# Patient Record
Sex: FEMALE | Race: BLACK OR AFRICAN AMERICAN | NOT HISPANIC OR LATINO | Employment: OTHER | URBAN - METROPOLITAN AREA
[De-identification: names, ages, dates, MRNs, and addresses within clinical notes are randomized per-mention and may not be internally consistent; named-entity substitution may affect disease eponyms.]

---

## 2019-08-27 PROBLEM — K64.9 HEMORRHOIDS: Status: ACTIVE | Noted: 2019-08-27

## 2019-08-27 PROBLEM — Z00.00 HEALTH CARE MAINTENANCE: Status: ACTIVE | Noted: 2019-08-27

## 2019-08-27 PROBLEM — I10 HYPERTENSION: Status: ACTIVE | Noted: 2019-08-27

## 2019-08-27 PROBLEM — J30.2 SEASONAL ALLERGIES: Status: ACTIVE | Noted: 2019-08-27

## 2019-08-27 PROBLEM — B00.9 HERPES: Status: ACTIVE | Noted: 2019-08-27

## 2019-11-27 PROBLEM — Z01.20 ENCOUNTER FOR DENTAL EXAMINATION: Status: ACTIVE | Noted: 2019-08-27

## 2019-11-27 PROBLEM — Z12.31 OTHER SCREENING MAMMOGRAM: Status: ACTIVE | Noted: 2019-11-27

## 2019-11-27 PROBLEM — B96.89 BACTERIAL VAGINOSIS: Status: ACTIVE | Noted: 2019-11-27

## 2019-11-27 PROBLEM — J06.9 UPPER RESPIRATORY INFECTION: Status: ACTIVE | Noted: 2019-11-27

## 2019-11-27 PROBLEM — B37.31 CANDIDIASIS OF GENITALIA IN FEMALE: Status: ACTIVE | Noted: 2019-11-27

## 2019-11-27 PROBLEM — M79.609 PAIN IN SOFT TISSUES OF LIMB: Status: ACTIVE | Noted: 2019-11-27

## 2019-11-27 PROBLEM — L30.9 DERMATITIS: Status: ACTIVE | Noted: 2019-11-27

## 2019-11-27 PROBLEM — K02.52 DENTAL CARIES ON PIT AND FISSURE SURFACE PENETRATING INTO DENTIN: Status: ACTIVE | Noted: 2017-02-15

## 2019-11-27 PROBLEM — F50.89 PICA: Status: ACTIVE | Noted: 2019-11-27

## 2019-11-27 PROBLEM — Z87.39 HISTORY OF RHEUMATOID ARTHRITIS: Status: ACTIVE | Noted: 2019-11-27

## 2019-11-27 PROBLEM — R11.10 VOMITING ALONE: Status: ACTIVE | Noted: 2019-11-27

## 2019-11-27 PROBLEM — K02.62 DENTAL CARIES ON SMOOTH SURFACE PENETRATING INTO DENTIN: Status: ACTIVE | Noted: 2017-05-26

## 2019-11-27 PROBLEM — M54.9 BACKACHE: Status: ACTIVE | Noted: 2019-11-27

## 2019-11-27 PROBLEM — K05.00 ACUTE GINGIVITIS, PLAQUE INDUCED: Status: ACTIVE | Noted: 2017-02-15

## 2019-11-27 PROBLEM — M06.09 RHEUMATOID ARTHRITIS OF MULTIPLE SITES WITH NEGATIVE RHEUMATOID FACTOR: Status: ACTIVE | Noted: 2019-05-07

## 2019-11-27 PROBLEM — K04.7 PERIAPICAL ABSCESS WITHOUT SINUS: Status: ACTIVE | Noted: 2019-11-27

## 2019-11-27 PROBLEM — N76.0 BACTERIAL VAGINOSIS: Status: ACTIVE | Noted: 2019-11-27

## 2019-11-27 PROBLEM — K21.9 GERD WITHOUT ESOPHAGITIS: Status: ACTIVE | Noted: 2019-11-27

## 2019-11-27 PROBLEM — Z20.2 EXPOSURE TO SEXUALLY TRANSMITTED DISEASE (STD): Status: ACTIVE | Noted: 2019-11-27

## 2019-11-27 PROBLEM — N76.0 ACUTE VAGINITIS: Status: ACTIVE | Noted: 2019-11-27

## 2019-11-27 PROBLEM — N89.8 PROBLEMATIC VAGINAL DISCHARGE: Status: ACTIVE | Noted: 2019-11-27

## 2019-12-09 ENCOUNTER — TELEPHONE (OUTPATIENT)
Dept: PHARMACY | Facility: CLINIC | Age: 49
End: 2019-12-09

## 2019-12-09 NOTE — TELEPHONE ENCOUNTER
No answer/VM to inform patient that [facility] received prescription for Humira CF and prior authorization is required.  [facility] will be back in touch once insurance determination is received.     Pupils equal, round and reactive to light, Extra-ocular movement intact, eyes are clear b/l

## 2019-12-11 NOTE — TELEPHONE ENCOUNTER
DOCUMENTATION ONLY:     Prior Authorization for Humira APPROVED from 12/11/19 to 12/11/20.      Case ID# [id number]    Co-Pay: $0.00    Patient Assistance IS NOT required.     Forward to clinical pharmacist for consult & shipment.      MARIE

## 2019-12-16 NOTE — TELEPHONE ENCOUNTER
Humira consult declined, experienced to medication. Pt confirmed shipping of Humira CF on  to arrive on . Address and  verified. $0 copay in 004. Pt is in need of a new sharps container. Pt has 0 doses on hand, next dose due . All questions answered and addressed to patients satisfaction. [facility] to contact patient in 3 weeks for refills.

## 2020-02-10 ENCOUNTER — TELEPHONE (OUTPATIENT)
Dept: PHARMACY | Facility: CLINIC | Age: 50
End: 2020-02-10

## 2020-03-13 ENCOUNTER — TELEPHONE (OUTPATIENT)
Dept: PHARMACY | Facility: CLINIC | Age: 50
End: 2020-03-13

## 2020-05-12 ENCOUNTER — TELEPHONE (OUTPATIENT)
Dept: PHARMACY | Facility: CLINIC | Age: 50
End: 2020-05-12

## 2020-05-28 ENCOUNTER — TELEPHONE (OUTPATIENT)
Dept: PHARMACY | Facility: CLINIC | Age: 50
End: 2020-05-28

## 2020-05-28 NOTE — TELEPHONE ENCOUNTER
Patient confirmed shipping of Humira on  to arrive . Address and  verified. $0 copay (004). Patient stated she has 0 doses left. She reported 0 missed doses. Next dose due . She stated she has not started any new medications or had any dose changes. She reported no new allergies or health conditions. Patient had no further questions or concerns.

## 2020-06-26 ENCOUNTER — TELEPHONE (OUTPATIENT)
Dept: PHARMACY | Facility: CLINIC | Age: 50
End: 2020-06-26

## 2020-07-07 ENCOUNTER — TELEPHONE (OUTPATIENT)
Dept: PHARMACY | Facility: CLINIC | Age: 50
End: 2020-07-07

## 2020-07-07 NOTE — TELEPHONE ENCOUNTER
Informed Patient  that [facility] received prescription for Actemra  and prior authorization is required.  [facility] will be back in touch once insurance determination is received.

## 2020-07-08 NOTE — TELEPHONE ENCOUNTER
DOCUMENTATION ONLY:     Prior Authorization for Actemra APPROVED from 7/8/20 to 1/7/21.      Case ID# [id number]    Co-Pay: $0.00     Patient Assistance IS NOT required.     Forward to clinical pharmacist for consult & shipment.     MARIE

## 2020-07-09 ENCOUNTER — TELEPHONE (OUTPATIENT)
Dept: PHARMACY | Facility: CLINIC | Age: 50
End: 2020-07-09

## 2020-07-09 NOTE — TELEPHONE ENCOUNTER
Initial Actemra Syringe consult completed on . Actemra will be shipped on  to arrive at patient's home on  via [employer]. $ 0 copay.  Patient plans to start Actemra on . Address confirmed. Confirmed 2 patient identifiers - name and . Therapy Appropriate.     Counseled patient on administration directions:  Inject Actemra 162mg/0.9ml Syringe (1 syringe ) into the skin every 14 days.  - Take out the refrigerator 30-60 minutes prior to injection.   - Wash hands before and after injection.  - Monthly RX will come with gauze, bandaids, and alcohol swabs.  - Patient may inject in either the tops of the thighs, abdomen- but at least 2 inches away from her belly button, or the outer part of her upper arm. Patient was instructed to rotate injections sites.  - Patient is to wipe down the injection site with the alcohol pad, wait to dry. Gently squeeze the area of the cleaned skin and hold it firmly. Insert the needle in to the skin between a 45 and 90 degree angle, release the skin and push down on the plunger to administer the dose.   - Place the entire syringe into the sharps container. Once the container is full, per [place] law, she may lock the sharps container and place in her trash. She can then contact the Pharmacy and we will replace the sharps at no additional charge.     Patient was counseled on possible side effects:  - Injection site reaction: redness, soreness, itching, bruising, which should resolve within 3-5 days.  - Increased risk for infections. If patient becomes sick, patient is to hold Actemra use until she/ he is better.      DDI: none, medication list reviewed.     Patient verbalized understanding. Compliance stressed. Patient advised to keep a calendar marking dates of injections to ensure better compliance. Patient advised to call myself or provider should any questions arise. Patient plans to start Actemra on . Consultation included: indication; goals of treatment; administration; storage  and handling; side effects; how to handle side effects; the importance of compliance; how to handle missed doses; the importance of laboratory monitoring; the importance of keeping all follow up appointments. Patient understands to report any medication changes to [facility] and provider. All questions answered and addressed to patients satisfaction. [facility] to contact patient in 3 weeks for refills and will follow up in 90 days.

## 2020-08-08 ENCOUNTER — TELEPHONE (OUTPATIENT)
Dept: PHARMACY | Facility: CLINIC | Age: 50
End: 2020-08-08

## 2020-08-08 NOTE — TELEPHONE ENCOUNTER
Refill call regarding Actemra from [facility]. Shipping out Actemra on  for  arrival with patients consent. Copay of $0 @ 004. Address and  confirmed.

## 2020-09-08 ENCOUNTER — TELEPHONE (OUTPATIENT)
Dept: PHARMACY | Facility: CLINIC | Age: 50
End: 2020-09-08

## 2020-10-07 ENCOUNTER — TELEPHONE (OUTPATIENT)
Dept: PHARMACY | Facility: CLINIC | Age: 50
End: 2020-10-07

## 2020-10-07 NOTE — TELEPHONE ENCOUNTER
LVM for pt regarding actemra refill 10/7 (attempt #1) spoke to mother on alternative line and she states she will have pt call [facility] back to set up refill.

## 2020-10-15 PROBLEM — Z79.899 HIGH RISK MEDICATION USE: Status: ACTIVE | Noted: 2020-10-15

## 2020-10-20 PROBLEM — M54.9 DORSALGIA, UNSPECIFIED: Status: ACTIVE | Noted: 2020-10-20

## 2020-10-20 PROBLEM — R44.1 HALLUCINATION, VISUAL: Status: ACTIVE | Noted: 2020-10-20

## 2020-10-21 PROBLEM — Z80.0 FAMILY HISTORY OF COLON CANCER IN FATHER: Status: ACTIVE | Noted: 2020-10-21

## 2020-10-21 PROBLEM — K92.1 HEMATOCHEZIA: Status: ACTIVE | Noted: 2020-10-21

## 2020-10-21 PROBLEM — E66.9 CLASS 2 OBESITY WITH BODY MASS INDEX (BMI) OF 35.0 TO 35.9 IN ADULT: Status: ACTIVE | Noted: 2020-10-21

## 2020-11-17 ENCOUNTER — TELEPHONE (OUTPATIENT)
Dept: PHARMACY | Facility: CLINIC | Age: 50
End: 2020-11-17

## 2020-12-16 ENCOUNTER — SPECIALTY PHARMACY (OUTPATIENT)
Dept: PHARMACY | Facility: CLINIC | Age: 50
End: 2020-12-16

## 2020-12-16 NOTE — TELEPHONE ENCOUNTER
Specialty Pharmacy - Refill Coordination    Specialty Medication Orders Linked to Encounter      Most Recent Value   Medication #1  tocilizumab (ACTEMRA) 162 mg/0.9 mL injection (Order#[id number], Rx#[id number])          Refill Questions - Documented Responses      Most Recent Value   Relationship to patient of person spoken to?  Self   HIPAA/medical authority confirmed?  Yes   Any changes in contact preferences or allowed representatives?  No   Has the patient had any insurance changes?  No   Has the patient had any changes to specialty medication, dose, or instructions?  No   Has the patient started taking any new medications, herbals, or supplements?  No   Has the patient been diagnosed with any new medical conditions?  No   Does the patient have any new allergies to medications or foods?  No   Does the patient have any concerns about side effects?  No   Can the patient store medication/sharps container properly (at the correct temperature, away from children/pets, etc.)?  Yes   Can the patient call emergency services (911) in the event of an emergency?  Yes   Does the patient have any concerns or questions about taking or administering this medication as prescribed?  No   How many doses did the patient miss in the past 4 weeks or since the last fill?  0   How many doses does the patient have on hand?  0   How many days does the patient report on hand quantity will last?  0   Does the number of doses/days supply remaining match pharmacy expected amounts?  Yes   Does the patient feel that this medication is effective?  Yes   During the past 4 weeks, has patient missed any activities due to condition or medication?  No   During the past 4 weeks, did patient have any of the following urgent care visits?  None   How will the patient receive the medication?  Mail   When does the patient need to receive the medication?  12/27/20   Shipping Address  Home   Address in [facility] confirmed and updated if  neccessary?  Yes   Expected Copay ($)  0   Is the patient able to afford the medication copay?  Yes   Payment Method  zero copay   Days supply of Refill  28   Would patient like to speak to a pharmacist?  No   Do you want to trigger an intervention?  No   Do you want to trigger an additional referral task?  No   Refill activity completed?  Yes   Refill activity plan  Refill scheduled   Shipment/Pickup Date:  12/21/20          Current Outpatient Medications   Medication Sig    acetaminophen (TYLENOL) 500 MG tablet Take 2 tablets by mouth as needed.     acyclovir (ZOVIRAX) 400 MG tablet Take 1 tablet (400 mg total) by mouth once daily.    amLODIPine (NORVASC) 10 MG tablet Take 1 tablet (10 mg total) by mouth once daily.    cetirizine (ZYRTEC) 10 MG tablet Take 10 mg by mouth once daily.     DULoxetine (CYMBALTA) 30 MG capsule Take 1 capsule (30 mg total) by mouth once daily.    ergocalciferol (ERGOCALCIFEROL) 50,000 unit Cap Take 1 capsule by mouth.    ferrous sulfate 324 mg (65 mg iron) TbEC Take 325 mg by mouth once daily.     fluticasone propionate (FLONASE ALLERGY RELIEF) 50 mcg/actuation nasal spray 1 spray by Nasal route as needed.     folic acid (FOLVITE) 1 MG tablet Take 1 tablet (1 mg total) by mouth once daily.    lidocaine HCl 4 % Crea Apply topically.    meloxicam (MOBIC) 15 MG tablet Take 1 tablet (15 mg total) by mouth once daily.    methotrexate 2.5 MG Tab Take 8 tablets (20 mg total) by mouth every 7 days.    ondansetron (ZOFRAN) 4 MG tablet take 2 tablet by oral route  every 6 hours AS NEEDEDFOR NAUSEA AND VOMITING    tocilizumab (ACTEMRA) 162 mg/0.9 mL injection Inject 0.9 mLs (162 mg total) into the skin every 14 (fourteen) days.   Last reviewed on 11/12/2020  8:45 AM by Alissa Marshall RN    Review of patient's allergies indicates:   Allergen Reactions    Lisinopril Other (See Comments)     Tongue swelling    Rituxan [rituximab] Itching and Swelling    Last reviewed on  12/9/2020  10:16 AM by Alessandro Simons      Tasks added this encounter   1/13/2021 - Refill Call (Auto Added)   Tasks due within next 3 months   12/22/2020 - Clinical - Follow Up Assesement (90 day)     Page Pepper  [facility]  [address] [place] [address]  Phone: [phone]  Fax: [phone]

## 2020-12-28 PROBLEM — G47.00 INSOMNIA: Status: ACTIVE | Noted: 2020-12-28

## 2020-12-28 PROBLEM — F33.1 MODERATE EPISODE OF RECURRENT MAJOR DEPRESSIVE DISORDER: Status: ACTIVE | Noted: 2020-12-28

## 2021-01-13 PROBLEM — K02.52 DENTAL CARIES ON PIT AND FISSURE SURFACE PENETRATING INTO DENTIN: Status: RESOLVED | Noted: 2017-02-15 | Resolved: 2021-01-13

## 2021-01-14 PROBLEM — N76.0 ACUTE VAGINITIS: Status: RESOLVED | Noted: 2019-11-27 | Resolved: 2021-01-14

## 2021-01-14 PROBLEM — N76.0 BACTERIAL VAGINOSIS: Status: RESOLVED | Noted: 2019-11-27 | Resolved: 2021-01-14

## 2021-01-14 PROBLEM — Z80.0 FAMILY HISTORY OF COLON CANCER IN FATHER: Status: RESOLVED | Noted: 2020-10-21 | Resolved: 2021-01-14

## 2021-01-14 PROBLEM — Z87.39 HISTORY OF RHEUMATOID ARTHRITIS: Status: RESOLVED | Noted: 2019-11-27 | Resolved: 2021-01-14

## 2021-01-14 PROBLEM — L30.9 DERMATITIS: Status: RESOLVED | Noted: 2019-11-27 | Resolved: 2021-01-14

## 2021-01-14 PROBLEM — F50.89 PICA: Status: RESOLVED | Noted: 2019-11-27 | Resolved: 2021-01-14

## 2021-01-14 PROBLEM — K04.7 PERIAPICAL ABSCESS WITHOUT SINUS: Status: RESOLVED | Noted: 2019-11-27 | Resolved: 2021-01-14

## 2021-01-14 PROBLEM — R11.10 VOMITING ALONE: Status: RESOLVED | Noted: 2019-11-27 | Resolved: 2021-01-14

## 2021-01-14 PROBLEM — Z00.00 HEALTHCARE MAINTENANCE: Status: RESOLVED | Noted: 2019-08-27 | Resolved: 2021-01-14

## 2021-01-14 PROBLEM — K92.1 HEMATOCHEZIA: Status: RESOLVED | Noted: 2020-10-21 | Resolved: 2021-01-14

## 2021-01-14 PROBLEM — M54.9 DORSALGIA, UNSPECIFIED: Status: RESOLVED | Noted: 2020-10-20 | Resolved: 2021-01-14

## 2021-01-14 PROBLEM — K05.00 ACUTE GINGIVITIS, PLAQUE INDUCED: Status: RESOLVED | Noted: 2017-02-15 | Resolved: 2021-01-14

## 2021-01-14 PROBLEM — R44.1 HALLUCINATION, VISUAL: Status: RESOLVED | Noted: 2020-10-20 | Resolved: 2021-01-14

## 2021-01-14 PROBLEM — J06.9 UPPER RESPIRATORY INFECTION: Status: RESOLVED | Noted: 2019-11-27 | Resolved: 2021-01-14

## 2021-01-14 PROBLEM — B37.31 CANDIDIASIS OF GENITALIA IN FEMALE: Status: RESOLVED | Noted: 2019-11-27 | Resolved: 2021-01-14

## 2021-01-14 PROBLEM — Z12.31 OTHER SCREENING MAMMOGRAM: Status: RESOLVED | Noted: 2019-11-27 | Resolved: 2021-01-14

## 2021-01-14 PROBLEM — Z20.2 EXPOSURE TO SEXUALLY TRANSMITTED DISEASE (STD): Status: RESOLVED | Noted: 2019-11-27 | Resolved: 2021-01-14

## 2021-01-14 PROBLEM — N89.8 PROBLEMATIC VAGINAL DISCHARGE: Status: RESOLVED | Noted: 2019-11-27 | Resolved: 2021-01-14

## 2021-01-14 PROBLEM — B96.89 BACTERIAL VAGINOSIS: Status: RESOLVED | Noted: 2019-11-27 | Resolved: 2021-01-14

## 2021-01-15 ENCOUNTER — SPECIALTY PHARMACY (OUTPATIENT)
Dept: PHARMACY | Facility: CLINIC | Age: 51
End: 2021-01-15

## 2021-01-28 ENCOUNTER — TELEPHONE (OUTPATIENT)
Dept: PHARMACY | Facility: CLINIC | Age: 51
End: 2021-01-28

## 2021-01-31 PROBLEM — R53.82 CHRONIC FATIGUE: Status: ACTIVE | Noted: 2021-01-31

## 2021-01-31 PROBLEM — M15.9 PRIMARY OSTEOARTHRITIS INVOLVING MULTIPLE JOINTS: Status: ACTIVE | Noted: 2021-01-31

## 2021-02-05 ENCOUNTER — SPECIALTY PHARMACY (OUTPATIENT)
Dept: PHARMACY | Facility: CLINIC | Age: 51
End: 2021-02-05

## 2021-03-02 ENCOUNTER — SPECIALTY PHARMACY (OUTPATIENT)
Dept: PHARMACY | Facility: CLINIC | Age: 51
End: 2021-03-02

## 2021-03-10 PROBLEM — E55.9 VITAMIN D DEFICIENCY: Status: ACTIVE | Noted: 2021-03-10

## 2021-03-19 PROBLEM — K62.5 RECTAL BLEEDING: Status: ACTIVE | Noted: 2021-03-19

## 2021-03-19 PROBLEM — Z12.11 COLON CANCER SCREENING: Status: ACTIVE | Noted: 2019-08-27

## 2021-03-20 PROBLEM — K62.5 RECTAL BLEEDING: Status: RESOLVED | Noted: 2021-03-19 | Resolved: 2021-03-20

## 2021-04-01 ENCOUNTER — SPECIALTY PHARMACY (OUTPATIENT)
Dept: PHARMACY | Facility: CLINIC | Age: 51
End: 2021-04-01

## 2021-04-29 ENCOUNTER — SPECIALTY PHARMACY (OUTPATIENT)
Dept: PHARMACY | Facility: CLINIC | Age: 51
End: 2021-04-29

## 2021-05-31 ENCOUNTER — SPECIALTY PHARMACY (OUTPATIENT)
Dept: PHARMACY | Facility: CLINIC | Age: 51
End: 2021-05-31

## 2021-06-19 PROBLEM — Z12.11 COLON CANCER SCREENING: Status: RESOLVED | Noted: 2019-08-27 | Resolved: 2021-06-19

## 2021-06-19 PROBLEM — K02.62 DENTAL CARIES ON SMOOTH SURFACE PENETRATING INTO DENTIN: Status: RESOLVED | Noted: 2017-05-26 | Resolved: 2021-06-19

## 2021-07-08 ENCOUNTER — SPECIALTY PHARMACY (OUTPATIENT)
Dept: PHARMACY | Facility: CLINIC | Age: 51
End: 2021-07-08

## 2021-08-13 ENCOUNTER — SPECIALTY PHARMACY (OUTPATIENT)
Dept: PHARMACY | Facility: CLINIC | Age: 51
End: 2021-08-13

## 2021-08-31 PROBLEM — M62.81 MUSCULAR WEAKNESS: Status: ACTIVE | Noted: 2021-08-31

## 2021-09-05 PROBLEM — R11.2 NON-INTRACTABLE VOMITING WITH NAUSEA: Status: ACTIVE | Noted: 2019-11-27

## 2021-09-05 PROBLEM — M06.9 RHEUMATOID ARTHRITIS: Status: ACTIVE | Noted: 2019-05-07

## 2021-09-05 PROBLEM — R10.10 PAIN OF UPPER ABDOMEN: Status: ACTIVE | Noted: 2021-09-05

## 2021-09-05 PROBLEM — F22 DELUSIONS: Status: ACTIVE | Noted: 2021-09-05

## 2021-09-17 PROBLEM — F20.0 PARANOID SCHIZOPHRENIA: Status: ACTIVE | Noted: 2021-09-17

## 2021-09-20 ENCOUNTER — SPECIALTY PHARMACY (OUTPATIENT)
Dept: PHARMACY | Facility: CLINIC | Age: 51
End: 2021-09-20

## 2021-10-04 ENCOUNTER — SPECIALTY PHARMACY (OUTPATIENT)
Dept: PHARMACY | Facility: CLINIC | Age: 51
End: 2021-10-04

## 2021-10-26 ENCOUNTER — SPECIALTY PHARMACY (OUTPATIENT)
Dept: PHARMACY | Facility: CLINIC | Age: 51
End: 2021-10-26

## 2021-10-26 RX ORDER — ERGOCALCIFEROL 1.25 MG/1
50000 CAPSULE ORAL
COMMUNITY
Start: 2021-10-06 | End: 2021-11-11

## 2021-11-17 PROBLEM — M06.00 RHEUMATOID ARTHRITIS WITH NEGATIVE RHEUMATOID FACTOR: Status: ACTIVE | Noted: 2019-05-07

## 2021-11-17 PROBLEM — E87.6 HYPOKALEMIA: Status: ACTIVE | Noted: 2021-11-17

## 2021-11-17 PROBLEM — F25.8 OTHER SCHIZOAFFECTIVE DISORDERS: Status: ACTIVE | Noted: 2021-11-17

## 2021-11-23 ENCOUNTER — SPECIALTY PHARMACY (OUTPATIENT)
Dept: PHARMACY | Facility: CLINIC | Age: 51
End: 2021-11-23

## 2021-12-20 ENCOUNTER — SPECIALTY PHARMACY (OUTPATIENT)
Dept: PHARMACY | Facility: CLINIC | Age: 51
End: 2021-12-20

## 2021-12-28 PROBLEM — K80.20 GALLSTONES: Status: ACTIVE | Noted: 2021-12-28

## 2022-01-18 ENCOUNTER — SPECIALTY PHARMACY (OUTPATIENT)
Dept: PHARMACY | Facility: CLINIC | Age: 52
End: 2022-01-18

## 2022-01-18 NOTE — TELEPHONE ENCOUNTER
Specialty Pharmacy - Refill Coordination    Specialty Medication Orders Linked to Encounter    Flowsheet Row Most Recent Value   Medication #1 tocilizumab (ACTEMRA) 162 mg/0.9 mL injection (Order#[id number], Rx#[id number])        Pt had gallstrone removal 1/12 and was started on new Rx (norco).  No significant DDI except additive CNS depression/driving precautions advised (on Trazodone + Risperidone also)   Pt stated she was not specifically given advisement regarding her Actemra in the setting of surgery and she already took her dose yesterday.  She reports no issues with her wound or healing so far.  She was advised to double check with surgeon at F/U 1/25 to ensure she is ok to inject her next dose on 1/31 and was advised in the future to get instruction regarding Actemra in the setting of surgery.  Pt aware to immediately reach out to provider if she develops any symptoms of infection.        Refill Questions - Documented Responses    Flowsheet Row Most Recent Value   Patient Availability and HIPAA Verification    Does patient want to proceed with activity? Yes   HIPAA/medical authority confirmed? Yes   Relationship to patient of person spoken to? Self   Refill Screening Questions    Changes to allergies? No   Changes to medications? Yes  [Norco]   New conditions since last clinic visit? No   Unplanned office visit, urgent care, ED, or hospital admission in the last 4 weeks? No   How does patient/caregiver feel medication is working? Very good   Financial problems or insurance changes? No   How many doses of your specialty medications were missed in the last 4 weeks? 0   Would patient like to speak to a pharmacist? No   When does the patient need to receive the medication? 01/31/22   Refill Delivery Questions    How will the patient receive the medication? Mail   When does the patient need to receive the medication? 01/31/22   Shipping Address Home   Address in [facility] confirmed and updated if  neccessary? Yes   Expected Copay ($) 0   Is the patient able to afford the medication copay? Yes   Payment Method zero copay   Days supply of Refill 28   Supplies needed? No supplies needed   Refill activity completed? Yes   Refill activity plan Refill scheduled   Shipment/Pickup Date: 01/25/22          Current Outpatient Medications   Medication Sig    acetaminophen (TYLENOL 8 HOUR ORAL) Take by mouth.    amLODIPine (NORVASC) 10 MG tablet Take 1 tablet (10 mg total) by mouth once daily.    atorvastatin (LIPITOR) 40 MG tablet Take 1 tablet (40 mg total) by mouth every evening.    HYDROcodone-acetaminophen (NORCO)  mg per tablet Take 1 tablet by mouth every 6 (six) hours as needed for Pain.    risperiDONE (RISPERDAL) 1 MG tablet Take 1 tablet (1 mg total) by mouth every morning AND 2 tablets (2 mg total) every evening.    tocilizumab (ACTEMRA) 162 mg/0.9 mL injection Inject 0.9 mLs (162 mg total) into the skin every 14 (fourteen) days.    traZODone (DESYREL) 100 MG tablet Take 1 tablet (100 mg total) by mouth nightly as needed for Insomnia.   Last reviewed on 1/12/2022  9:26 AM by Virgie Beckham MD    Review of patient's allergies indicates:   Allergen Reactions    Lisinopril Other (See Comments)     Tongue swelling    Rituxan [rituximab] Itching and Swelling    Last reviewed on  1/12/2022 12:00 PM by Annette Latham      Tasks added this encounter   2/21/2022 - Refill Call (Auto Added)   Tasks due within next 3 months   No tasks due.     Charity Olivarez, PharmD  [facility] - [facility]  [address] [facility] [address]  Phone: [phone]  Fax: [phone]

## 2022-02-21 ENCOUNTER — SPECIALTY PHARMACY (OUTPATIENT)
Dept: PHARMACY | Facility: CLINIC | Age: 52
End: 2022-02-21

## 2022-02-23 NOTE — TELEPHONE ENCOUNTER
DOCUMENTATION ONLY:  Prior authorization for Actemra approved from 2/23/2022 to 2/23/2023    Case Id: [id number]    Co-pay: 0.00    LVM for refill.

## 2022-03-03 ENCOUNTER — SPECIALTY PHARMACY (OUTPATIENT)
Dept: PHARMACY | Facility: CLINIC | Age: 52
End: 2022-03-03

## 2022-03-03 NOTE — TELEPHONE ENCOUNTER
Specialty Pharmacy - Refill Coordination    Specialty Medication Orders Linked to Encounter    Flowsheet Row Most Recent Value   Medication #1 tocilizumab (ACTEMRA) 162 mg/0.9 mL injection (Order#[id number], Rx#[id number])          Refill Questions - Documented Responses    Flowsheet Row Most Recent Value   Patient Availability and HIPAA Verification    Does patient want to proceed with activity? Yes   HIPAA/medical authority confirmed? Yes   Relationship to patient of person spoken to? Self   Refill Screening Questions    Changes to allergies? No   Changes to medications? No   New conditions since last clinic visit? No   Unplanned office visit, urgent care, ED, or hospital admission in the last 4 weeks? No   How does patient/caregiver feel medication is working? Very good   Financial problems or insurance changes? No   How many doses of your specialty medications were missed in the last 4 weeks? 0   Would patient like to speak to a pharmacist? No   When does the patient need to receive the medication? 03/15/22   Refill Delivery Questions    How will the patient receive the medication? Delivery Ema   When does the patient need to receive the medication? 03/15/22   Shipping Address Home   Address in [facility] confirmed and updated if neccessary? Yes   Expected Copay ($) 0   Is the patient able to afford the medication copay? Yes   Payment Method zero copay   Days supply of Refill 28   Supplies needed? No supplies needed   Refill activity completed? Yes   Refill activity plan Refill scheduled   Shipment/Pickup Date: 03/09/22          Current Outpatient Medications   Medication Sig    acetaminophen (TYLENOL 8 HOUR ORAL) Take by mouth.    amLODIPine (NORVASC) 10 MG tablet Take 1 tablet (10 mg total) by mouth once daily.    atorvastatin (LIPITOR) 40 MG tablet Take 1 tablet (40 mg total) by mouth every evening.    HYDROcodone-acetaminophen (NORCO)  mg per tablet Take 1 tablet by mouth every 6 (six)  hours as needed for Pain.    omeprazole (PRILOSEC) 20 MG capsule Take 20 mg by mouth once daily.    risperiDONE (RISPERDAL) 1 MG tablet Take 1 tablet (1 mg total) by mouth every morning AND 2 tablets (2 mg total) every evening.    tocilizumab (ACTEMRA) 162 mg/0.9 mL injection Inject 0.9 mLs (162 mg total) into the skin every 14 (fourteen) days.    traZODone (DESYREL) 100 MG tablet Take 1 tablet (100 mg total) by mouth nightly as needed for Insomnia.   Last reviewed on 2/1/2022 10:14 AM by Vera Combs MA    Review of patient's allergies indicates:   Allergen Reactions    Lisinopril Other (See Comments)     Tongue swelling    Rituxan [rituximab] Itching and Swelling    Last reviewed on  2/1/2022 10:12 AM by Evens Guajardo      Tasks added this encounter   4/5/2022 - Refill Call (Auto Added)   Tasks due within next 3 months   No tasks due.     Verónica Milligan, PharmD  [facility]  [address]  [facility] [address]  Phone: [phone]  Fax: [phone]

## 2022-03-23 PROBLEM — Z00.00 HEALTHCARE MAINTENANCE: Status: ACTIVE | Noted: 2022-03-23

## 2022-03-23 PROBLEM — M79.10 MYALGIA: Status: ACTIVE | Noted: 2022-03-23

## 2022-04-09 ENCOUNTER — SPECIALTY PHARMACY (OUTPATIENT)
Dept: PHARMACY | Facility: CLINIC | Age: 52
End: 2022-04-09

## 2022-04-20 NOTE — TELEPHONE ENCOUNTER
Specialty Pharmacy - Refill Coordination    Specialty Medication Orders Linked to Encounter    Flowsheet Row Most Recent Value   Medication #1 tocilizumab (ACTEMRA) 162 mg/0.9 mL injection (Order#[id number], Rx#[id number])          Refill Questions - Documented Responses    Flowsheet Row Most Recent Value   Patient Availability and HIPAA Verification    Does patient want to proceed with activity? Yes   HIPAA/medical authority confirmed? Yes   Relationship to patient of person spoken to? Self   Refill Screening Questions    Changes to allergies? No   Changes to medications? No   New conditions since last clinic visit? No   Unplanned office visit, urgent care, ED, or hospital admission in the last 4 weeks? No   How does patient/caregiver feel medication is working? Good   Financial problems or insurance changes? No   How many doses of your specialty medications were missed in the last 4 weeks? 0   Would patient like to speak to a pharmacist? No   When does the patient need to receive the medication? 04/22/22   Refill Delivery Questions    How will the patient receive the medication? Mail   When does the patient need to receive the medication? 04/22/22   Shipping Address Home   Address in [facility] confirmed and updated if neccessary? Yes   Expected Copay ($) 0   Is the patient able to afford the medication copay? Yes   Payment Method zero copay   Days supply of Refill 28   Supplies needed? No supplies needed   Refill activity completed? Yes   Refill activity plan Refill scheduled   Shipment/Pickup Date: 04/20/22          Current Outpatient Medications   Medication Sig    acetaminophen (TYLENOL 8 HOUR ORAL) Take by mouth.    amLODIPine (NORVASC) 10 MG tablet Take 1 tablet (10 mg total) by mouth once daily.    atorvastatin (LIPITOR) 40 MG tablet Take 1 tablet (40 mg total) by mouth every evening.    DULoxetine (CYMBALTA) 30 MG capsule Take 30 mg by mouth once daily.    HYDROcodone-acetaminophen (NORCO)   mg per tablet Take 1 tablet by mouth every 6 (six) hours as needed for Pain.    LIDOcaine (XYLOCAINE) 5 % Oint ointment Apply topically as needed (apply as needed for muscle pain).    omeprazole (PRILOSEC) 20 MG capsule Take 20 mg by mouth once daily.    risperiDONE (RISPERDAL) 2 MG tablet Take 2 mg by mouth nightly.    tocilizumab (ACTEMRA) 162 mg/0.9 mL injection Inject 0.9 mLs (162 mg total) into the skin every 14 (fourteen) days.    traZODone (DESYREL) 100 MG tablet Take 1 tablet (100 mg total) by mouth nightly as needed for Insomnia.   Last reviewed on 3/23/2022  8:42 AM by Danyelle Min MA    Review of patient's allergies indicates:   Allergen Reactions    Lisinopril Other (See Comments)     Tongue swelling    Rituxan [rituximab] Itching and Swelling    Last reviewed on  3/23/2022 8:41 AM by Danyelle Min      Tasks added this encounter   No tasks added.   Tasks due within next 3 months   4/18/2022 - Refill Call (Auto Added)     [facility]  [address]  [facility] [address]  Phone: [phone]  Fax: [phone]

## 2022-05-12 ENCOUNTER — SPECIALTY PHARMACY (OUTPATIENT)
Dept: PHARMACY | Facility: CLINIC | Age: 52
End: 2022-05-12

## 2022-05-12 NOTE — TELEPHONE ENCOUNTER
Outgoing call to patient regarding Actemra refill. Informed patient that medication is RTS and someone would follow-up with her 5/16 to schedule refill. Patient due to inject 5/19.

## 2022-05-18 NOTE — TELEPHONE ENCOUNTER
Specialty Pharmacy - Refill Coordination    Specialty Medication Orders Linked to Encounter    Flowsheet Row Most Recent Value   Medication #1 tocilizumab (ACTEMRA) 162 mg/0.9 mL injection (Order#[id number], Rx#[id number])        Patient started new medication but is not aware of the name of the medications.  I reviewed her most recent meds in her med list in Epic and she said these were not the new medications.  I encouraged patient to call [facility] back whenever she was around the new medication so we can review for DDI.  Pt agreed and said she will call back.     Refill Questions - Documented Responses    Flowsheet Row Most Recent Value   Patient Availability and HIPAA Verification    Does patient want to proceed with activity? Yes   HIPAA/medical authority confirmed? Yes   Relationship to patient of person spoken to? Self   Refill Screening Questions    Changes to allergies? No   Changes to medications? Yes  [Patient not aware of name of new medication]   New conditions since last clinic visit? No   Unplanned office visit, urgent care, ED, or hospital admission in the last 4 weeks? No   How does patient/caregiver feel medication is working? Good   Financial problems or insurance changes? No   How many doses of your specialty medications were missed in the last 4 weeks? 0   Would patient like to speak to a pharmacist? No   When does the patient need to receive the medication? 05/23/22   Refill Delivery Questions    How will the patient receive the medication? Mail   When does the patient need to receive the medication? 05/23/22   Shipping Address Home   Address in [facility] confirmed and updated if neccessary? Yes   Expected Copay ($) 0   Is the patient able to afford the medication copay? Yes   Payment Method zero copay   Days supply of Refill 28   Supplies needed? No supplies needed   Refill activity completed? Yes   Refill activity plan Refill scheduled   Shipment/Pickup Date: 05/19/22          Current  Outpatient Medications   Medication Sig    acetaminophen (TYLENOL 8 HOUR ORAL) Take by mouth.    acyclovir (ZOVIRAX) 400 MG tablet Take 1 tablet (400 mg total) by mouth 3 (three) times daily. for 14 days    amLODIPine (NORVASC) 10 MG tablet Take 1 tablet (10 mg total) by mouth once daily.    atorvastatin (LIPITOR) 40 MG tablet Take 1 tablet (40 mg total) by mouth every evening.    DULoxetine (CYMBALTA) 30 MG capsule Take 30 mg by mouth once daily.    omeprazole (PRILOSEC) 20 MG capsule Take 20 mg by mouth once daily.    risperiDONE (RISPERDAL) 2 MG tablet Take 2 mg by mouth nightly.    tocilizumab (ACTEMRA) 162 mg/0.9 mL injection Inject 0.9 mLs (162 mg total) into the skin every 14 (fourteen) days.    traZODone (DESYREL) 50 MG tablet Take 25-50 mg by mouth nightly as needed.   Last reviewed on 5/4/2022  1:13 PM by Alison hCiu LPN    Review of patient's allergies indicates:   Allergen Reactions    Lisinopril Other (See Comments)     Tongue swelling    Rituxan [rituximab] Itching and Swelling    Last reviewed on  5/4/2022 1:07 PM by Alison Chiu      Tasks added this encounter   6/13/2022 - Refill Call (Auto Added)   Tasks due within next 3 months   No tasks due.     Charity Olivarez, PharmD  [facility] - [facility] Pharm[facility]  [address] [place] [address]  Phone: [phone]  Fax: [phone]

## 2022-06-09 ENCOUNTER — SPECIALTY PHARMACY (OUTPATIENT)
Dept: PHARMACY | Facility: CLINIC | Age: 52
End: 2022-06-09

## 2022-06-09 NOTE — TELEPHONE ENCOUNTER
Specialty Pharmacy - Clinical Reassessment    Specialty Medication Orders Linked to Encounter    Flowsheet Row Most Recent Value   Medication #1 tocilizumab (ACTEMRA) 162 mg/0.9 mL injection (Order#[id number], Rx#[id number])        Patient Diagnosis   M06.00 - Rheumatoid arthritis with negative rheumatoid factor    Specialty clinical pharmacist review completed for an annual review of reassessment. Reviewed the following areas: current med list, reports of adverse effects, adherence and progress towards therapeutic goals.    Recommendations: none at this time.    Tasks added this encounter   No tasks added.   Tasks due within next 3 months   6/13/2022 - Refill Call (Auto Added)     Verónica Milligan, PharmD  [facility]  [address] [facility] [address]  Phone: [phone]  Fax: [phone]

## 2022-06-22 ENCOUNTER — SPECIALTY PHARMACY (OUTPATIENT)
Dept: PHARMACY | Facility: CLINIC | Age: 52
End: 2022-06-22

## 2022-06-22 NOTE — TELEPHONE ENCOUNTER
Specialty Pharmacy - Refill Coordination    Specialty Medication Orders Linked to Encounter    Flowsheet Row Most Recent Value   Medication #1 tocilizumab (ACTEMRA) 162 mg/0.9 mL injection (Order#[id number], Rx#[id number])          Refill Questions - Documented Responses    Flowsheet Row Most Recent Value   Patient Availability and HIPAA Verification    Does patient want to proceed with activity? Yes   HIPAA/medical authority confirmed? Yes   Relationship to patient of person spoken to? Self   Refill Screening Questions    Changes to allergies? No   Changes to medications? No   New conditions since last clinic visit? No   Unplanned office visit, urgent care, ED, or hospital admission in the last 4 weeks? No   How does patient/caregiver feel medication is working? Good   Financial problems or insurance changes? No   How many doses of your specialty medications were missed in the last 4 weeks? 0   Would patient like to speak to a pharmacist? No   When does the patient need to receive the medication? 06/23/22   Refill Delivery Questions    How will the patient receive the medication? Mail   When does the patient need to receive the medication? 06/23/22   Shipping Address Home   Address in [facility] confirmed and updated if neccessary? Yes   Expected Copay ($) 0   Is the patient able to afford the medication copay? Yes   Payment Method zero copay   Days supply of Refill 28   Supplies needed? No supplies needed   Refill activity completed? Yes   Refill activity plan Refill scheduled   Shipment/Pickup Date: 06/23/22          Current Outpatient Medications   Medication Sig    acetaminophen (TYLENOL 8 HOUR ORAL) Take by mouth.    acyclovir (ZOVIRAX) 400 MG tablet Take 1 tablet (400 mg total) by mouth 3 (three) times daily. for 14 days    amLODIPine (NORVASC) 10 MG tablet Take 1 tablet (10 mg total) by mouth once daily.    atorvastatin (LIPITOR) 40 MG tablet Take 1 tablet (40 mg total) by mouth every evening.     DULoxetine (CYMBALTA) 30 MG capsule Take 30 mg by mouth once daily.    omeprazole (PRILOSEC) 20 MG capsule Take 20 mg by mouth once daily.    risperiDONE (RISPERDAL) 2 MG tablet Take 2 mg by mouth nightly.    tocilizumab (ACTEMRA) 162 mg/0.9 mL injection Inject 0.9 mLs (162 mg total) into the skin every 14 (fourteen) days.    traZODone (DESYREL) 50 MG tablet Take 25-50 mg by mouth nightly as needed.   Last reviewed on 5/4/2022  1:13 PM by Alison Chiu LPN    Review of patient's allergies indicates:   Allergen Reactions    Lisinopril Other (See Comments)     Tongue swelling    Rituxan [rituximab] Itching and Swelling    Last reviewed on  5/4/2022 1:07 PM by Alison Chiu      Tasks added this encounter   7/14/2022 - Refill Call (Auto Added)   Tasks due within next 3 months   No tasks due.     Verónica Milligan, PharmD  [facility]  [address] [place], [place] [id number]  [facility] [address]  Phone: [phone]  Fax: [phone]

## 2022-06-27 PROBLEM — M25.50 POLYARTHRALGIA: Status: ACTIVE | Noted: 2022-06-27

## 2022-06-27 PROBLEM — Z00.00 HEALTHCARE MAINTENANCE: Status: RESOLVED | Noted: 2022-03-23 | Resolved: 2022-06-27

## 2022-06-28 PROBLEM — M47.816 FACET HYPERTROPHY OF LUMBAR REGION: Status: ACTIVE | Noted: 2022-06-28

## 2022-08-01 ENCOUNTER — SPECIALTY PHARMACY (OUTPATIENT)
Dept: PHARMACY | Facility: CLINIC | Age: 52
End: 2022-08-01

## 2022-08-01 NOTE — TELEPHONE ENCOUNTER
Specialty Pharmacy - Refill Coordination     Patient no longer covered under [employer]. Unable to pull up new insurance or get in touch with patient to get new info. Closing out of [facility].     Specialty Medication Orders Linked to Encounter    Flowsheet Row Most Recent Value   Medication #1 tocilizumab (ACTEMRA) 162 mg/0.9 mL injection (Order#[id number], Rx#[id number])        Refill Questions - Documented Responses    Flowsheet Row Most Recent Value   Patient Availability and HIPAA Verification    Does patient want to proceed with activity? Unable to Reach        We have had multiple attempts to the patient and have been unsuccessful to reach the patient. We will stop reaching out to the patient but in the event that the patient needs the med and contacts us, we will communicate and begin dispensing for the patient. At your next visit with the patient, please review the importance of being in contact with our specialty pharmacy as a part of our care team.      Verónica Milligan, PharmD  [facility]  [address]  [facility] [address]  Phone: [phone]  Fax: [phone]

## 2022-10-26 PROBLEM — R06.01 ORTHOPNEA: Status: ACTIVE | Noted: 2022-10-26

## 2023-04-26 PROBLEM — M06.09 RHEUMATOID ARTHRITIS OF MULTIPLE SITES WITH NEGATIVE RHEUMATOID FACTOR: Status: ACTIVE | Noted: 2023-04-26

## 2023-04-26 PROBLEM — Z79.899 IMMUNOSUPPRESSION DUE TO DRUG THERAPY: Status: ACTIVE | Noted: 2023-04-26

## 2023-04-26 PROBLEM — D84.821 IMMUNOSUPPRESSION DUE TO DRUG THERAPY: Status: ACTIVE | Noted: 2023-04-26
